# Patient Record
Sex: FEMALE | Race: WHITE
[De-identification: names, ages, dates, MRNs, and addresses within clinical notes are randomized per-mention and may not be internally consistent; named-entity substitution may affect disease eponyms.]

---

## 2017-05-17 ENCOUNTER — HOSPITAL ENCOUNTER (OUTPATIENT)
Dept: HOSPITAL 76 - DI.S | Age: 52
Discharge: HOME | End: 2017-05-17
Attending: NATUROPATH
Payer: COMMERCIAL

## 2017-05-17 DIAGNOSIS — Z12.31: Primary | ICD-10-CM

## 2017-05-17 PROCEDURE — 77067 SCR MAMMO BI INCL CAD: CPT

## 2017-05-18 ENCOUNTER — HOSPITAL ENCOUNTER (OUTPATIENT)
Age: 52
Discharge: HOME | End: 2017-05-18
Payer: COMMERCIAL

## 2017-05-18 DIAGNOSIS — M85.89: ICD-10-CM

## 2017-05-18 DIAGNOSIS — Z13.820: Primary | ICD-10-CM

## 2017-05-18 NOTE — MAMMOGRAPHY REPORT
DIGITAL SCREENING MAMMOGRAM: 05/17/2017

 

CLINICAL INDICATION: A 51-year-old nulliparous patient, for screening. 

 

COMPARISON: 10/2011, 08/2010, 10/2007. 

 

TECHNIQUE:  Routine CC and MLO projections were obtained of the breasts. Bilateral laterally exaggera
saeid craniocaudal views. 

 

FINDINGS:  The breasts again demonstrate extremely dense parenchyma bilaterally, limiting the sensiti
vity of mammography. A few punctate, typically benign calcifications are present. No suspicious daniel
s, clustered microcalcifications, or regions of architectural distortion are identified.  

 

IMPRESSION:  BENIGN FINDINGS. 

 

RECOMMENDATION: ROUTINE ANNUAL SCREENING UNLESS OTHERWISE CLINICALLY INDICATED. 

 

BIRADS CATEGORY 2-BENIGN FINDINGS. 

 

STANDARD QUALIFYING STATEMENTS

 

1. This examination was reviewed with the aid of Computer-Aided Detection (CAD).

 

2. A negative or benign imaging report should not delay biopsy if clinically suspicious findings are 
present. Consider surgical consultation if warranted. More than 5% of cancers are not identified by i
maging.

 

3. Dense breasts may obscure an underlying neoplasm.

 

 

 

DD:05/18/2017 15:44:53  DT: 05/18/2017 16:40  JOB #: [id number]  EXT JOB #:[id number]

## 2019-03-19 ENCOUNTER — HOSPITAL ENCOUNTER (OUTPATIENT)
Dept: HOSPITAL 76 - LAB.F | Age: 54
Discharge: HOME | End: 2019-03-19
Attending: NATUROPATH
Payer: MEDICAID

## 2019-03-19 DIAGNOSIS — R29.2: ICD-10-CM

## 2019-03-19 DIAGNOSIS — Z00.01: Primary | ICD-10-CM

## 2019-03-19 DIAGNOSIS — Z13.1: ICD-10-CM

## 2019-03-19 DIAGNOSIS — Z13.0: ICD-10-CM

## 2019-03-19 DIAGNOSIS — Z13.21: ICD-10-CM

## 2019-03-19 DIAGNOSIS — Z13.220: ICD-10-CM

## 2019-03-19 DIAGNOSIS — Z11.59: ICD-10-CM

## 2019-03-19 LAB
ALBUMIN DIAFP-MCNC: 3.6 G/DL (ref 3.2–5.5)
ALBUMIN/GLOB SERPL: 1.2 {RATIO} (ref 1–2.2)
ALP SERPL-CCNC: 71 IU/L (ref 42–121)
ALT SERPL W P-5'-P-CCNC: 30 IU/L (ref 10–60)
ANION GAP SERPL CALCULATED.4IONS-SCNC: 6 MMOL/L (ref 6–13)
AST SERPL W P-5'-P-CCNC: 32 IU/L (ref 10–42)
BASOPHILS NFR BLD AUTO: 0 10^3/UL (ref 0–0.1)
BASOPHILS NFR BLD AUTO: 0.7 %
BILIRUB BLD-MCNC: 0.9 MG/DL (ref 0.2–1)
BUN SERPL-MCNC: 12 MG/DL (ref 6–20)
CALCIUM UR-MCNC: 8.9 MG/DL (ref 8.5–10.3)
CHLORIDE SERPL-SCNC: 103 MMOL/L (ref 101–111)
CHOLEST SERPL-MCNC: 224 MG/DL
CO2 SERPL-SCNC: 27 MMOL/L (ref 21–32)
CREAT SERPLBLD-SCNC: 0.7 MG/DL (ref 0.4–1)
EOSINOPHIL # BLD AUTO: 0.1 10^3/UL (ref 0–0.7)
EOSINOPHIL NFR BLD AUTO: 2.7 %
ERYTHROCYTE [DISTWIDTH] IN BLOOD BY AUTOMATED COUNT: 14 % (ref 12–15)
EST. AVERAGE GLUCOSE BLD GHB EST-MCNC: 114 MG/DL (ref 70–100)
GFRSERPLBLD MDRD-ARVRAT: 87 ML/MIN/{1.73_M2} (ref 89–?)
GLOBULIN SER-MCNC: 2.9 G/DL (ref 2.1–4.2)
GLUCOSE SERPL-MCNC: 102 MG/DL (ref 70–100)
HB2 TOTAL: 15.2 G/DL
HBA1C BLD-MCNC: 0.57 G/DL
HDLC SERPL-MCNC: 97 MG/DL
HDLC SERPL: 2.3 {RATIO} (ref ?–4.4)
HEMOGLOBIN A1C %: 5.6 % (ref 4.6–6.2)
HGB UR QL STRIP: 14.2 G/DL (ref 12–16)
LDLC SERPL CALC-MCNC: 118 MG/DL
LDLC/HDLC SERPL: 1.2 {RATIO} (ref ?–4.4)
LYMPHOCYTES # SPEC AUTO: 1.4 10^3/UL (ref 1.5–3.5)
LYMPHOCYTES NFR BLD AUTO: 40.2 %
MCH RBC QN AUTO: 27.4 PG (ref 27–31)
MCHC RBC AUTO-ENTMCNC: 32.8 G/DL (ref 32–36)
MCV RBC AUTO: 83.6 FL (ref 81–99)
MONOCYTES # BLD AUTO: 0.3 10^3/UL (ref 0–1)
MONOCYTES NFR BLD AUTO: 8.4 %
NEUTROPHILS # BLD AUTO: 1.6 10^3/UL (ref 1.5–6.6)
NEUTROPHILS # SNV AUTO: 3.4 X10^3/UL (ref 4.8–10.8)
NEUTROPHILS NFR BLD AUTO: 48 %
PDW BLD AUTO: 8.3 FL (ref 7.9–10.8)
PLATELET # BLD: 210 10^3/UL (ref 130–450)
PROT SPEC-MCNC: 6.5 G/DL (ref 6.7–8.2)
RBC MAR: 5.19 10^6/UL (ref 4.2–5.4)
SODIUM SERPLBLD-SCNC: 136 MMOL/L (ref 135–145)
VLDLC SERPL-SCNC: 9 MG/DL

## 2019-03-19 PROCEDURE — 86803 HEPATITIS C AB TEST: CPT

## 2019-03-19 PROCEDURE — 87340 HEPATITIS B SURFACE AG IA: CPT

## 2019-03-19 PROCEDURE — 80061 LIPID PANEL: CPT

## 2019-03-19 PROCEDURE — 85025 COMPLETE CBC W/AUTO DIFF WBC: CPT

## 2019-03-19 PROCEDURE — 83036 HEMOGLOBIN GLYCOSYLATED A1C: CPT

## 2019-03-19 PROCEDURE — 86708 HEPATITIS A ANTIBODY: CPT

## 2019-03-19 PROCEDURE — 81599 UNLISTED MAAA: CPT

## 2019-03-19 PROCEDURE — 80053 COMPREHEN METABOLIC PANEL: CPT

## 2019-03-19 PROCEDURE — 83721 ASSAY OF BLOOD LIPOPROTEIN: CPT

## 2019-03-19 PROCEDURE — 86704 HEP B CORE ANTIBODY TOTAL: CPT

## 2019-03-19 PROCEDURE — 86706 HEP B SURFACE ANTIBODY: CPT

## 2019-03-19 PROCEDURE — 36415 COLL VENOUS BLD VENIPUNCTURE: CPT

## 2019-04-25 ENCOUNTER — HOSPITAL ENCOUNTER (OUTPATIENT)
Dept: HOSPITAL 76 - LAB.F | Age: 54
Discharge: HOME | End: 2019-04-25
Attending: NATUROPATH
Payer: MEDICAID

## 2019-04-25 DIAGNOSIS — Z11.59: Primary | ICD-10-CM

## 2019-04-25 PROCEDURE — 36415 COLL VENOUS BLD VENIPUNCTURE: CPT

## 2019-04-25 PROCEDURE — 87350 HEPATITIS BE AG IA: CPT

## 2020-06-19 ENCOUNTER — HOSPITAL ENCOUNTER (OUTPATIENT)
Dept: HOSPITAL 76 - DI.S | Age: 55
Discharge: HOME | End: 2020-06-19
Attending: NATUROPATH
Payer: MEDICAID

## 2020-06-19 DIAGNOSIS — M25.532: Primary | ICD-10-CM

## 2020-06-19 NOTE — XRAY REPORT
PROCEDURE:  Wrist 4 View LT

 

INDICATIONS: LEFT WRIST PAIN

 

TECHNIQUE:  4 views of the wrist were acquired.  

 

COMPARISON:  None

 

FINDINGS:  

 

Bones:  No fractures or dislocations.  No suspicious bony lesions.  

 

Scaphoid view:  Negative

 

Soft tissues:  No suspicious soft tissue calcifications.  

 

IMPRESSION:  

No acute fracture. No osseous lesion. If symptoms and/or clinical suspicion for pathology continue, f
urther assessment with repeat plain films, or advanced imaging (e.g., CT, MRI, or bone scan) is recom
mended for further assessment.

 

Reviewed by: Taqueria Rey MD on 6/19/2020 2:53 PM PDT

Approved by: Taqueria Rey MD on 6/19/2020 2:53 PM PDT

 

 

Station ID:  [id number]

## 2020-11-04 ENCOUNTER — HOSPITAL ENCOUNTER (OUTPATIENT)
Dept: HOSPITAL 76 - DI | Age: 55
Discharge: HOME | End: 2020-11-04
Attending: NATUROPATH
Payer: MEDICAID

## 2020-11-04 DIAGNOSIS — M85.89: Primary | ICD-10-CM

## 2020-11-04 PROCEDURE — 77080 DXA BONE DENSITY AXIAL: CPT

## 2020-11-04 NOTE — DEXA REPORT
PROCEDURE: Dexa Spine and/or Hip

 

INDICATIONS: OSTEOPOROSIS

 

TECHNIQUE: Dual energy x-ray absorptiometry (DXA) was performed on a [employer] System.  Regions measur
ed are the AP Spine, femoral neck, and if needed forearm.

 

COMPARISON: 5/18/2017.

  

FINDINGS:

 

Lumbar Spine: 

Bone Mineral Density   0.982 g/cm/cm,T score  -1.6, osteopenia

 

Left Hip:

Bone Mineral Density  0.777 g/cm/cm,T score -1.8, osteopenia

 

Left Femoral Neck:

Bone Mineral Density  0.833 g/cm/cm, T score -1.5, osteopenia

 

 

(T score greater or equal to -1.0: NORMAL)

(T score from -1.1 to -2.4: OSTEOPENIA)

(T score less than or equal to -2.5 to: OSTEOPOROSIS)

 

 

Impression: Osteopenia. Bone marrow density has decreased 8% compared to 5/18/2017.

 

Patients with diagnosis of osteoporosis or osteopenia should have regular bone mineral density assess
ment.  For those eligible for Medicare, routine testing is allowed once every 2 years.  Testing frequ
ency can be increased for patients who have rapidly progressing disease or for those who are receivin
g medical therapy to restore bone mass.

 

Reviewed by: Krystal Oliveira MD, PhD on 11/4/2020 10:45 AM PST

Approved by: Krystal Oliveira MD, PhD on 11/4/2020 10:45 AM PST

 

 

Station ID:  [id number]

## 2021-02-15 ENCOUNTER — HOSPITAL ENCOUNTER (OUTPATIENT)
Dept: HOSPITAL 76 - DI.S | Age: 56
Discharge: HOME | End: 2021-02-15
Attending: NATUROPATH
Payer: MEDICAID

## 2021-02-15 DIAGNOSIS — Z12.31: Primary | ICD-10-CM

## 2021-02-16 NOTE — MAMMOGRAPHY REPORT
BILATERAL DIGITAL SCREENING MAMMOGRAM 3D/2D: 2/15/2021

 

CLINICAL: Routine screening.  

 

Comparison is made to exams dated:  5/17/2017 mammogram, 10/28/2011 mammogram, and 8/12/2010 mammogra
m - [facility].  The tissue of both breasts is extremely dense, which lowers the se
nsitivity of mammography.  

 

No significant masses, calcifications, or other findings are seen in either breast.  

There has been no significant interval change.

 

IMPRESSION: NEGATIVE

There is no mammographic evidence of malignancy. A 1 year screening mammogram is recommended.  

 

 

This exam was interpreted at Station ID: [id number].  

 

NOTE: For mammograms, a report in lay terms will be sent to the patient. Approximately 15% of breast 
malignancies will not be visualized mammographically. In the management of a palpable breast mass, a 
negative mammogram must not discourage biopsy of a clinically suspicious lesion.

 

Electronically Signed By: Srinivas Spann M.D.          

ddp/penrad:2/15/2021 14:41:50  

 

 

 

ACR BI-RADS Category 1: Negative 3341F

PARENCHYMAL PATTERN: (VD) - The breast(s) demonstrate(s) extremely dense parenchyma, limiting the sen
sitivity of mammography.

BI-RADS CATEGORY: (1) - 1

RECOMMENDATION: (ANNUAL)  - Recommend routine annual screening mammography.

[id number]

1 year screening

LATERALITY: (B)

## 2022-08-31 ENCOUNTER — HOSPITAL ENCOUNTER (OUTPATIENT)
Dept: HOSPITAL 76 - DI | Age: 57
Discharge: HOME | End: 2022-08-31
Attending: NURSE PRACTITIONER
Payer: MEDICAID

## 2022-08-31 DIAGNOSIS — R10.9: ICD-10-CM

## 2022-08-31 DIAGNOSIS — R30.0: Primary | ICD-10-CM

## 2022-08-31 NOTE — ULTRASOUND REPORT
PROCEDURE:  Abdomen Complete

 

INDICATIONS:  DYSURIA

 

TECHNIQUE:  

Real-time scanning was performed of the abdominal and retroperitoneal organs, with image documentatio
n.  

 

COMPARISON:  None.

 

FINDINGS:  

 

Liver:  Liver is normal in size and homogeneous in echotexture.  

 

Gallbladder: Is within normal limits

 

Biliary ducts:  Intrahepatic bile ducts are non-dilated.  Extrahepatic bile duct caliber measures 5.8
 mm.  Normal is 6-7 mm or less in diameter, or 10 mm or less post-cholecystectomy.  

 

Pancreas:  Visualized portions of the pancreas are sonographically normal.  

 

Spleen:  Spleen is normal in size and homogeneous in echotexture.  

 

Kidneys:  Kidneys are normal in size and echotexture.  Right kidney measures 10.9 cm long; left kidne
y measures 10.6 cm long.  No hydronephrosis. Hyperechoic focus within the left superior kidney measur
ing 3 mm..  No solid masses.  

 

Aorta:  Visualized aorta is normal in caliber at less than 3 cm.  

 

Iliacs:  Proximal common iliac arteries are normal in caliber at less than 2.5 cm.  

 

IVC:  Intrahepatic inferior vena cava is patent.  

 

Miscellaneous:  No free abdominal fluid.  

 

IMPRESSION:  

 

1. Possible nonobstructing left renal calculus. This could be further assessed with CT KUB, if clinic
ally indicated.

2. No acute process.

 

Reviewed by: Taqueria Rey MD on 8/31/2022 3:55 PM PDT

Approved by: Taqueria Rey MD on 8/31/2022 3:55 PM PDT

 

 

Station ID:  [id number]

## 2022-12-07 ENCOUNTER — HOSPITAL ENCOUNTER (OUTPATIENT)
Dept: HOSPITAL 76 - DI | Age: 57
Discharge: HOME | End: 2022-12-07
Attending: NURSE PRACTITIONER
Payer: MEDICAID

## 2022-12-07 DIAGNOSIS — M85.89: Primary | ICD-10-CM

## 2022-12-07 NOTE — DEXA REPORT
PROCEDURE: Dexa Spine and/or Hip

 

INDICATIONS: OSTEOPENIA

 

TECHNIQUE: Dual energy x-ray absorptiometry (DXA) was performed on a [employer].  Regions measur
ed are the AP Spine, femoral neck, and if needed forearm.

 

COMPARISON: DEXA, 11/04/2020.

  

FINDINGS:

 

Lumbar Spine: 

Bone Mineral Density   0.993 g/cm/cm,T score  -1.6, osteopenic

 

Left Femoral Neck:

Bone Mineral Density  0.761 g/cm/cm, T score -2.0, osteopenic

 

Left Hip:

Bone Mineral Density  0.721 g/cm/cm,T score -2.3, osteopenic

 

 

(T score greater or equal to -1.0: NORMAL)

(T score from -1.1 to -2.4: OSTEOPENIA)

(T score less than or equal to -2.5 to: OSTEOPOROSIS)

 

Impression: 

 

1. Based on WHO criteria, the patient has osteopenia. 

2. Compared to last exam on 11/04/2020, the patient's bone mineral density in lumbar spine is unchang
ed. The patient's bone mineral density in the left hip has decreased by 7.2%.

 

Patients with diagnosis of osteoporosis or osteopenia should have regular bone mineral density assess
ment.  For those eligible for Medicare, routine testing is allowed once every 2 years.  Testing frequ
ency can be increased for patients who have rapidly progressing disease or for those who are receivin
g medical therapy to restore bone mass.

 

Reviewed by: Hattie Tatum MD on 12/7/2022 4:58 PM PST

Approved by: Hattie Tatum MD on 12/7/2022 4:58 PM PST

 

 

Station ID:  [id number]

## 2023-02-24 ENCOUNTER — HOSPITAL ENCOUNTER (OUTPATIENT)
Dept: HOSPITAL 76 - DI | Age: 58
Discharge: HOME | End: 2023-02-24
Attending: PHYSICIAN ASSISTANT
Payer: MEDICAID

## 2023-02-24 DIAGNOSIS — N63.11: Primary | ICD-10-CM

## 2023-02-27 NOTE — MAMMOGRAPHY REPORT
BILATERAL DIGITAL DIAGNOSTIC MAMMOGRAM 3D/2D WITH SPOT COMPRESSION: 2/24/2023

 

CLINICAL: Palpable right breast lump. Due for bilateral.  

 

Comparison is made to exams dated:  2/15/2021 mammogram and 5/17/2017 mammogram - [facility].  

 

Both breasts are extremely dense, which lowers the sensitivity of mammography (category d />75% gland
ular tissue).  

 

No significant masses, calcifications, or other findings are seen in either breast.  

 

IMPRESSION: INCOMPLETE: NEEDS ADDITIONAL IMAGING EVALUATION

There is no abnormality seen in the right breast to correspond with the palpable abnormality, however
, ultrasound is recommended.  

 

 

 

Based on Tyrer-Cuzick model (a risk assessment model), the patient's lifetime risk is 26.0% and her 1
0 year risk is 10.7%. If a patient has an elevated risk, a more comprehensive evaluation should be co
nsidered and/or a referral to a genetic counselor. The American Cancer Society, American College of R
adiology, and NCCN Guidelines advise the consideration of Breast MRI as an adjunct to screening mammo
graphy in patients whose "Lifetime risk to develop breast cancer" is 20% or higher.

 

 

This exam was interpreted at Station ID: [id number].  

 

NOTE: For mammograms, a report in lay terms will be sent to the patient. Approximately 15% of breast 
malignancies will not be visualized mammographically. In the management of a palpable breast mass, a 
negative mammogram must not discourage biopsy of a clinically suspicious lesion.

 

Electronically Signed By: Rishabh stratton/rajiv:2/24/2023 10:30:37  

 

 

 

ACR BI-RADS Category 0: Incomplete 3340F

PARENCHYMAL PATTERN: (VD) - The breast(s) demonstrate(s) extremely dense parenchyma, limiting the sen
sitivity of mammography.

BI-RADS CATEGORY: (0) - 0

Ultrasound

[id number]

Immediate follow-up

LATERALITY: (R)

## 2023-02-27 NOTE — ULTRASOUND REPORT
LIMITED ULTRASOUND OF RIGHT BREAST: 2/24/2023

CLINICAL: Palpable right breast lump.  

 

Comparison is made to exams dated:  2/24/2023 mammogram, 2/15/2021 mammogram, and 5/17/2017 mammogram
 - [facility].  

Color flow and real-time ultrasound of the right breast 11-1 o'clock region were performed.  Gray sca
le images of the real-time examination were reviewed.  

 

Dense fibroglandular tissue but no mass is identifed in the patient-indicated palpable area of concer
n.  

 

IMPRESSION: NEGATIVE 

Dense fibroglandular tissue but no mass is identifed in the patient-indicated palpable area of concer
n. Recommend clinical follow up.  

There is no sonographic evidence of malignancy.  

A 1 year screening mammogram is recommended.   

 

This exam was interpreted at Station ID: [id number].  

Electronically Signed By: Rishabh stratton/rajiv:2/24/2023 10:31:58  

 

 

 

Ultrasound BI-RADS: 1 Negative

BI-RADS CATEGORY: (1) - 1

RECOMMENDATION: (ANNUAL)  - Recommend routine annual screening mammography.

[id number]

1 year screening

LATERALITY: (B)